# Patient Record
(demographics unavailable — no encounter records)

---

## 2025-04-11 NOTE — PHYSICAL EXAM
[Alert] : alert [Normal Voice/Communication] : normal voice/communication [Healthy Appearing] : healthy appearing [Sclera] : the sclera and conjunctiva were normal [Hearing Threshold Finger Rub Not [place]] : hearing was normal [Normal Lips/Gums] : the lips and gums were normal [Normal Appearance] : the appearance of the neck was normal [No Respiratory Distress] : no respiratory distress [Auscultation Breath Sounds / Voice Sounds] : lungs were clear to auscultation bilaterally [Heart Rate And Rhythm] : heart rate was normal and rhythm regular [Normal S1, S2] : normal S1 and S2 [Bowel Sounds] : normal bowel sounds [Abdomen Tenderness] : non-tender [Abdomen Soft] : soft [Abnormal Walk] : normal gait [Normal Color / Pigmentation] : normal skin color and pigmentation [Oriented To Time, Place, And Person] : oriented to person, place, and time

## 2025-04-11 NOTE — ASSESSMENT
[FreeTextEntry1] : 50-year-old female presents with RUQ pain.   Plan:  RUQ: Although improving with dietary changes, not completely resolved. Will send for abdominal US to r/o possible biliary etiology. If US is negative, may consider trial of PPI in setting of possible gastritis.  Colonoscopy: Pt with no previous colonoscopy screening, however pt wishes to hold off at this time. Will plan to schedule at some point this year.   Will determine next steps in plan of care once US results are obtained. Pt to call if symptoms worsen. Pt agrees to plan, all questions answered. Seen with Dr. Camacho.

## 2025-04-11 NOTE — HISTORY OF PRESENT ILLNESS
[FreeTextEntry1] : Leelee Mccoy is a 50-year-old female presents today with complaints of RUQ discomfort. Pt reports symptom onset began approximately one month prior. No associated with any bowel issues/complaints. However, discomfort onset is random and radiates to the back. Pt has made changes to diet and noticed some improvement but not completely resolved. No Previous colonoscopy. Denies FMH of CRC. Denies bowel complaints, typically has bowel movements regularly without significant straining or overt bleeding such as melena or hematochezia. Denies upper GI symptoms such as GERD, nausea, vomiting, or dysphagia. Denies unintentional weight loss.

## 2025-04-11 NOTE — PHYSICAL EXAM
[Alert] : alert [Normal Voice/Communication] : normal voice/communication [Healthy Appearing] : healthy appearing [Sclera] : the sclera and conjunctiva were normal [Hearing Threshold Finger Rub Not [place]] : hearing was normal [Normal Lips/Gums] : the lips and gums were normal [Normal Appearance] : the appearance of the neck was normal [No Respiratory Distress] : no respiratory distress [Auscultation Breath Sounds / Voice Sounds] : lungs were clear to auscultation bilaterally [Heart Rate And Rhythm] : heart rate was normal and rhythm regular [Normal S1, S2] : normal S1 and S2 [Bowel Sounds] : normal bowel sounds [Abdomen Tenderness] : non-tender [Abdomen Soft] : soft [Abnormal Walk] : normal gait [Normal Color / Pigmentation] : normal skin color and pigmentation [Oriented To Time, Place, And Person] : oriented to person, place, and time